# Patient Record
Sex: FEMALE | Race: WHITE
[De-identification: names, ages, dates, MRNs, and addresses within clinical notes are randomized per-mention and may not be internally consistent; named-entity substitution may affect disease eponyms.]

---

## 2018-05-03 ENCOUNTER — HOSPITAL ENCOUNTER (OUTPATIENT)
Dept: HOSPITAL 19 - COL.VAS | Age: 38
End: 2018-05-03
Attending: INTERNAL MEDICINE
Payer: COMMERCIAL

## 2018-05-03 DIAGNOSIS — G43.009: ICD-10-CM

## 2018-05-03 DIAGNOSIS — I63.9: ICD-10-CM

## 2018-05-03 DIAGNOSIS — I65.23: Primary | ICD-10-CM

## 2018-05-03 DIAGNOSIS — I37.1: ICD-10-CM

## 2019-11-27 ENCOUNTER — HOSPITAL ENCOUNTER (EMERGENCY)
Dept: HOSPITAL 19 - COL.ER | Age: 39
Discharge: HOME | End: 2019-11-27
Payer: COMMERCIAL

## 2019-11-27 VITALS — WEIGHT: 165.35 LBS | BODY MASS INDEX: 25.06 KG/M2 | HEIGHT: 67.99 IN

## 2019-11-27 VITALS — SYSTOLIC BLOOD PRESSURE: 115 MMHG | DIASTOLIC BLOOD PRESSURE: 70 MMHG | HEART RATE: 90 BPM

## 2019-11-27 VITALS — TEMPERATURE: 98.2 F

## 2019-11-27 DIAGNOSIS — Z90.89: ICD-10-CM

## 2019-11-27 DIAGNOSIS — N70.11: Primary | ICD-10-CM

## 2019-11-27 LAB
ALBUMIN SERPL-MCNC: 4 GM/DL (ref 3.5–5)
ALP SERPL-CCNC: 48 U/L (ref 50–136)
ALT SERPL-CCNC: 20 U/L (ref 9–52)
ANION GAP SERPL CALC-SCNC: 4 MMOL/L (ref 7–16)
AST SERPL-CCNC: 24 U/L (ref 15–37)
BASOPHILS # BLD: 0.1 10*3/UL (ref 0–0.2)
BASOPHILS NFR BLD AUTO: 0.5 % (ref 0–2)
BILIRUB SERPL-MCNC: 0.2 MG/DL (ref 0–1)
BUN SERPL-MCNC: 17 MG/DL (ref 7–17)
CALCIUM SERPL-MCNC: 8.5 MG/DL (ref 8.4–10.2)
CHLORIDE SERPL-SCNC: 110 MMOL/L (ref 98–107)
CO2 SERPL-SCNC: 25 MMOL/L (ref 22–30)
CREAT SERPL-SCNC: 0.64 UMOL/L (ref 0.52–1.25)
CRP SERPL-MCNC: < 0.5 MG/DL (ref 0–0.9)
EOSINOPHIL # BLD: 0.1 10*3/UL (ref 0–0.7)
EOSINOPHIL NFR BLD: 1 % (ref 0–4)
ERYTHROCYTE [DISTWIDTH] IN BLOOD BY AUTOMATED COUNT: 13.4 % (ref 11.5–14.5)
GLUCOSE SERPL-MCNC: 117 MG/DL (ref 74–106)
GRANULOCYTES # BLD AUTO: 62.1 % (ref 42.2–75.2)
HCG SERPL QL: NEGATIVE
HCT VFR BLD AUTO: 42.2 % (ref 37–47)
HGB BLD-MCNC: 13.9 G/DL (ref 12.5–16)
LIPASE SERPL-CCNC: 126 U/L (ref 23–300)
LYMPHOCYTES # BLD: 3.2 10*3/UL (ref 1.2–3.4)
LYMPHOCYTES NFR BLD: 31 % (ref 20–51)
MCH RBC QN AUTO: 31 PG (ref 27–31)
MCHC RBC AUTO-ENTMCNC: 33 G/DL (ref 33–37)
MCV RBC AUTO: 93 FL (ref 80–100)
MONOCYTES # BLD: 0.5 10*3/UL (ref 0.1–0.6)
MONOCYTES NFR BLD AUTO: 5.2 % (ref 1.7–9.3)
NEUTROPHILS # BLD: 6.5 10*3/UL (ref 1.4–6.5)
PH UR STRIP.AUTO: 6 [PH] (ref 5–8)
PLATELET # BLD AUTO: 230 K/MM3 (ref 130–400)
PMV BLD AUTO: 10.6 FL (ref 7.4–10.4)
POTASSIUM SERPL-SCNC: 4.2 MMOL/L (ref 3.4–5)
PROT SERPL-MCNC: 7.3 GM/DL (ref 6.4–8.2)
RBC # BLD AUTO: 4.54 M/MM3 (ref 4.1–5.3)
RBC # UR: (no result) /HPF
SODIUM SERPL-SCNC: 139 MMOL/L (ref 137–145)
SP GR UR STRIP.AUTO: 1.02 (ref 1–1.03)
SQUAMOUS # URNS: (no result) /HPF
URN COLLECT METHOD CLASS: (no result)

## 2021-06-24 ENCOUNTER — HOSPITAL ENCOUNTER (EMERGENCY)
Dept: HOSPITAL 19 - COL.ER | Age: 41
Discharge: HOME | End: 2021-06-24
Attending: FAMILY MEDICINE
Payer: COMMERCIAL

## 2021-06-24 VITALS — WEIGHT: 168.43 LBS | BODY MASS INDEX: 26.44 KG/M2 | HEIGHT: 67.01 IN

## 2021-06-24 VITALS — HEART RATE: 66 BPM

## 2021-06-24 VITALS — TEMPERATURE: 97.8 F | DIASTOLIC BLOOD PRESSURE: 85 MMHG | SYSTOLIC BLOOD PRESSURE: 133 MMHG

## 2021-06-24 DIAGNOSIS — S66.912A: Primary | ICD-10-CM
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
## 2021-10-07 ENCOUNTER — HOSPITAL ENCOUNTER (EMERGENCY)
Dept: HOSPITAL 19 - COL.ER | Age: 41
Discharge: HOME | End: 2021-10-07
Attending: EMERGENCY MEDICINE
Payer: COMMERCIAL

## 2021-10-07 VITALS — HEART RATE: 70 BPM | SYSTOLIC BLOOD PRESSURE: 115 MMHG | DIASTOLIC BLOOD PRESSURE: 71 MMHG

## 2021-10-07 VITALS — HEIGHT: 67.01 IN | WEIGHT: 167.33 LBS | BODY MASS INDEX: 26.26 KG/M2

## 2021-10-07 VITALS — TEMPERATURE: 98.1 F

## 2021-10-07 DIAGNOSIS — N13.2: Primary | ICD-10-CM

## 2021-10-07 DIAGNOSIS — G43.909: ICD-10-CM

## 2021-10-07 DIAGNOSIS — Z90.711: ICD-10-CM

## 2021-10-07 DIAGNOSIS — Z88.1: ICD-10-CM

## 2021-10-07 DIAGNOSIS — Z79.899: ICD-10-CM

## 2021-10-07 DIAGNOSIS — Z90.49: ICD-10-CM

## 2021-10-07 DIAGNOSIS — F17.210: ICD-10-CM

## 2021-10-07 LAB
ALBUMIN SERPL-MCNC: 4.2 GM/DL (ref 3.5–5)
ALP SERPL-CCNC: 48 U/L (ref 0–750)
ALT SERPL-CCNC: 21 U/L (ref 0–55)
ANION GAP SERPL CALC-SCNC: 11 MMOL/L (ref 7–16)
AST SERPL-CCNC: 24 U/L (ref 5–34)
BASOPHILS # BLD: 0.1 K/MM3 (ref 0–0.2)
BASOPHILS NFR BLD AUTO: 0.7 % (ref 0–2)
BILIRUB SERPL-MCNC: 0.5 MG/DL (ref 0.2–1.2)
BUN SERPL-MCNC: 16 MG/DL (ref 7–19)
CALCIUM SERPL-MCNC: 9.4 MG/DL (ref 8.4–10.2)
CHLORIDE SERPL-SCNC: 110 MMOL/L (ref 98–107)
CO2 SERPL-SCNC: 19 MMOL/L (ref 22–29)
CREAT SERPL-SCNC: 1.07 MG/DL (ref 0.57–1.11)
EOSINOPHIL # BLD: 0.1 K/MM3 (ref 0–0.7)
EOSINOPHIL NFR BLD: 1.6 % (ref 0–4)
ERYTHROCYTE [DISTWIDTH] IN BLOOD BY AUTOMATED COUNT: 11.8 % (ref 11.5–14.5)
GLUCOSE SERPL-MCNC: 139 MG/DL (ref 70–99)
GRANULOCYTES # BLD AUTO: 64.6 % (ref 42.2–75.2)
HCT VFR BLD AUTO: 43.3 % (ref 37–47)
HGB BLD-MCNC: 14.6 G/DL (ref 12.5–16)
KETONES UR STRIP.AUTO-MCNC: (no result) MG/DL
LYMPHOCYTES # BLD: 2.4 K/MM3 (ref 1.2–3.4)
LYMPHOCYTES NFR BLD: 26 % (ref 20–51)
MCH RBC QN AUTO: 31 PG (ref 27–31)
MCHC RBC AUTO-ENTMCNC: 34 G/DL (ref 33–37)
MCV RBC AUTO: 91 FL (ref 80–100)
MONOCYTES # BLD: 0.6 K/MM3 (ref 0.1–0.6)
MONOCYTES NFR BLD AUTO: 6.9 % (ref 1.7–9.3)
NEUTROPHILS # BLD: 5.8 K/MM3 (ref 1.4–6.5)
PH UR STRIP.AUTO: 5 [PH] (ref 5–8)
PLATELET # BLD AUTO: 229 K/MM3 (ref 130–400)
PMV BLD AUTO: 10.8 FL (ref 7.4–10.4)
POTASSIUM SERPL-SCNC: 3.8 MMOL/L (ref 3.5–4.5)
PROT SERPL-MCNC: 7.8 GM/DL (ref 6.2–8.1)
RBC # BLD AUTO: 4.76 M/MM3 (ref 4.1–5.3)
RBC # UR STRIP.AUTO: (no result) /UL
RBC # UR: >50 /HPF
SODIUM SERPL-SCNC: 140 MMOL/L (ref 136–145)
SP GR UR STRIP.AUTO: 1.02 (ref 1–1.03)
SQUAMOUS # URNS: (no result) /HPF
UA DIPSTICK PNL UR STRIP.AUTO: (no result)
URN COLLECT METHOD CLASS: (no result)

## 2021-10-08 ENCOUNTER — HOSPITAL ENCOUNTER (OUTPATIENT)
Dept: HOSPITAL 19 - COL.ER | Age: 41
Setting detail: OBSERVATION
LOS: 1 days | Discharge: HOME | End: 2021-10-09
Attending: UROLOGY | Admitting: UROLOGY
Payer: COMMERCIAL

## 2021-10-08 VITALS — WEIGHT: 174.83 LBS | BODY MASS INDEX: 27.44 KG/M2 | HEIGHT: 67.01 IN

## 2021-10-08 VITALS — TEMPERATURE: 98.6 F | DIASTOLIC BLOOD PRESSURE: 54 MMHG | SYSTOLIC BLOOD PRESSURE: 125 MMHG | HEART RATE: 61 BPM

## 2021-10-08 DIAGNOSIS — F17.210: ICD-10-CM

## 2021-10-08 DIAGNOSIS — Z79.899: ICD-10-CM

## 2021-10-08 DIAGNOSIS — N20.1: Primary | ICD-10-CM

## 2021-10-08 DIAGNOSIS — G43.909: ICD-10-CM

## 2021-10-08 LAB
ALBUMIN SERPL-MCNC: 4.4 GM/DL (ref 3.5–5)
ALP SERPL-CCNC: 55 U/L (ref 0–750)
ALT SERPL-CCNC: 21 U/L (ref 0–55)
ANION GAP SERPL CALC-SCNC: 12 MMOL/L (ref 7–16)
AST SERPL-CCNC: 27 U/L (ref 5–34)
BASOPHILS # BLD: 0.1 K/MM3 (ref 0–0.2)
BASOPHILS NFR BLD AUTO: 0.4 % (ref 0–2)
BILIRUB SERPL-MCNC: 0.4 MG/DL (ref 0.2–1.2)
BUN SERPL-MCNC: 13 MG/DL (ref 7–19)
CALCIUM SERPL-MCNC: 9.6 MG/DL (ref 8.4–10.2)
CHLORIDE SERPL-SCNC: 107 MMOL/L (ref 98–107)
CO2 SERPL-SCNC: 22 MMOL/L (ref 22–29)
CREAT SERPL-SCNC: 1.07 MG/DL (ref 0.57–1.11)
EOSINOPHIL # BLD: 0 K/MM3 (ref 0–0.7)
EOSINOPHIL NFR BLD: 0.3 % (ref 0–4)
ERYTHROCYTE [DISTWIDTH] IN BLOOD BY AUTOMATED COUNT: 11.7 % (ref 11.5–14.5)
GLUCOSE SERPL-MCNC: 106 MG/DL (ref 70–99)
GRANULOCYTES # BLD AUTO: 75.8 % (ref 42.2–75.2)
HCT VFR BLD AUTO: 42.1 % (ref 37–47)
HGB BLD-MCNC: 14.3 G/DL (ref 12.5–16)
KETONES UR STRIP.AUTO-MCNC: (no result) MG/DL
LYMPHOCYTES # BLD: 2.2 K/MM3 (ref 1.2–3.4)
LYMPHOCYTES NFR BLD: 16.2 % (ref 20–51)
MCH RBC QN AUTO: 31 PG (ref 27–31)
MCHC RBC AUTO-ENTMCNC: 34 G/DL (ref 33–37)
MCV RBC AUTO: 91 FL (ref 80–100)
MONOCYTES # BLD: 1 K/MM3 (ref 0.1–0.6)
MONOCYTES NFR BLD AUTO: 7.1 % (ref 1.7–9.3)
NEUTROPHILS # BLD: 10.4 K/MM3 (ref 1.4–6.5)
PH UR STRIP.AUTO: 6 [PH] (ref 5–8)
PLATELET # BLD AUTO: 210 K/MM3 (ref 130–400)
PMV BLD AUTO: 10.4 FL (ref 7.4–10.4)
POTASSIUM SERPL-SCNC: 3.6 MMOL/L (ref 3.5–4.5)
PROT SERPL-MCNC: 8.2 GM/DL (ref 6.2–8.1)
RBC # BLD AUTO: 4.63 M/MM3 (ref 4.1–5.3)
RBC # UR STRIP.AUTO: (no result) /UL
RBC # UR: (no result) /HPF
SODIUM SERPL-SCNC: 141 MMOL/L (ref 136–145)
SP GR UR STRIP.AUTO: 1.01 (ref 1–1.03)
SQUAMOUS # URNS: (no result) /HPF
URN COLLECT METHOD CLASS: (no result)

## 2021-10-08 PROCEDURE — C1769 GUIDE WIRE: HCPCS

## 2021-10-08 PROCEDURE — C2617 STENT, NON-COR, TEM W/O DEL: HCPCS

## 2021-10-08 PROCEDURE — G0378 HOSPITAL OBSERVATION PER HR: HCPCS

## 2021-10-08 NOTE — NUR
PT SIGNS CONSENT FOR POSSIBLE SURGERY IN AM. MORPHINE 2MG IVP FOR PAIN 6/10 TO
RT FLANK. IS NPO. DENIES NAUSEA AT THIS TIME.

## 2021-10-08 NOTE — NUR
PT ARRIVES VIA W/C FROM ED. IS ALERT AND ORIENTED X4. UP TO BATHROOM, VOIDS
200CC OF LIGHT YELLOW URINE, STRAINED FOR NO STONE. HAS IV SITE TO RT AC,
FLUSHES WELL, IVF CONNECTED. ADMISSION QUESTIONS COMPLETED.

## 2021-10-09 VITALS — SYSTOLIC BLOOD PRESSURE: 110 MMHG | HEART RATE: 61 BPM | TEMPERATURE: 98.1 F | DIASTOLIC BLOOD PRESSURE: 57 MMHG

## 2021-10-09 VITALS — HEART RATE: 74 BPM | SYSTOLIC BLOOD PRESSURE: 118 MMHG | TEMPERATURE: 98.1 F | DIASTOLIC BLOOD PRESSURE: 74 MMHG

## 2021-10-09 VITALS — HEART RATE: 70 BPM | SYSTOLIC BLOOD PRESSURE: 121 MMHG | DIASTOLIC BLOOD PRESSURE: 70 MMHG

## 2021-10-09 VITALS — HEART RATE: 74 BPM | TEMPERATURE: 98.3 F | DIASTOLIC BLOOD PRESSURE: 74 MMHG | SYSTOLIC BLOOD PRESSURE: 121 MMHG

## 2021-10-09 VITALS — SYSTOLIC BLOOD PRESSURE: 107 MMHG | TEMPERATURE: 98.4 F | DIASTOLIC BLOOD PRESSURE: 53 MMHG | HEART RATE: 59 BPM

## 2021-10-09 VITALS — DIASTOLIC BLOOD PRESSURE: 78 MMHG | SYSTOLIC BLOOD PRESSURE: 121 MMHG | HEART RATE: 70 BPM

## 2021-10-09 VITALS — TEMPERATURE: 98.1 F | SYSTOLIC BLOOD PRESSURE: 121 MMHG | HEART RATE: 70 BPM | DIASTOLIC BLOOD PRESSURE: 69 MMHG

## 2021-10-09 VITALS — TEMPERATURE: 98.5 F | HEART RATE: 54 BPM | SYSTOLIC BLOOD PRESSURE: 134 MMHG | DIASTOLIC BLOOD PRESSURE: 60 MMHG

## 2021-10-09 VITALS — SYSTOLIC BLOOD PRESSURE: 118 MMHG | DIASTOLIC BLOOD PRESSURE: 67 MMHG | HEART RATE: 78 BPM

## 2021-10-09 NOTE — NUR
talked with brother of patient before patient came back from a
procedure.  then came back but patient said nothing needed at this
time.

## 2021-10-09 NOTE — NUR
Patient returns from cysto.  Assessment unchanged except stent string taped to
left thigh.  No c/o at this time.  Ambulated to bathroom, voided 400ml clear
urine.